# Patient Record
Sex: MALE | Race: WHITE | Employment: UNEMPLOYED | URBAN - METROPOLITAN AREA
[De-identification: names, ages, dates, MRNs, and addresses within clinical notes are randomized per-mention and may not be internally consistent; named-entity substitution may affect disease eponyms.]

---

## 2018-03-16 ENCOUNTER — OFFICE VISIT (OUTPATIENT)
Dept: SURGERY | Age: 28
End: 2018-03-16
Payer: COMMERCIAL

## 2018-03-16 VITALS
WEIGHT: 140 LBS | HEART RATE: 62 BPM | SYSTOLIC BLOOD PRESSURE: 112 MMHG | RESPIRATION RATE: 16 BRPM | HEIGHT: 69 IN | OXYGEN SATURATION: 97 % | BODY MASS INDEX: 20.73 KG/M2 | DIASTOLIC BLOOD PRESSURE: 78 MMHG

## 2018-03-16 DIAGNOSIS — K80.20 SYMPTOMATIC CHOLELITHIASIS: ICD-10-CM

## 2018-03-16 PROCEDURE — 99204 OFFICE O/P NEW MOD 45 MIN: CPT | Performed by: SURGERY

## 2018-03-16 NOTE — PATIENT INSTRUCTIONS
Cholecystectomy -- Laparoscopic Surgery   (Gallbladder Removal; Lap Monica)       Definition   Cholecystectomy is the surgical removal of the gallbladder. This procedure is most often done laparoscopically. This is done through several small incisions in the abdomen. In some cases, the doctor may switch to open surgery . This involves a large incision in the abdomen. Laparoscopic Cholecystectomy vs. Open Cholecystectomy                Reasons for Procedure   This surgery is used to remove a diseased or damaged gallbladder. The damage is often due to gallstones . Sometimes, gallstones get stuck in the gallbladder's ducts. Bile, which helps with digestion, normally flows through these ducts. The blockage can damage the gallbladder and the liver. Possible Complications   · Complications are rare, but no procedure is completely free of risk. · Gallstones that have accidentally spilled into the abdominal cavity   · Bleeding   · Infection   · Injury to other nearby structures or organs   · Reactions to general anesthesia   · Blood clots   What to Expect   · Arrange for a ride to and from the procedure. Also, have someone help you at home. · The night before, eat a light meal. Do not eat or drink anything after midnight. Anesthesia    General anesthesia will be used. You will be asleep for the procedure. Description of Procedure    Four small openings will be made in your abdomen. Carbon dioxide will be pumped in to the abdomen to provide a better view. The laparoscope will be inserted through one of the openings. It will provide images of the gallbladder and surrounding area. Instruments will be inserted through the small openings. They will be used to grasp the gallbladder and clip off the main artery and duct. The gallbladder will be removed through one of the small openings. Dye may be injected into the duct to look for stones. The entire abdomen will be carefully examined.  The incisions will be closed with sutures or staples. They will be covered with bandages. Your doctor may place a tiny, flexible tube into the area. This tube will exit from your abdomen into a little bulb. This is to drain fluid. The tube is usually removed within one week. Immediately After Procedure    You will be taken to a recovery room. How Long Will It Take? About 30-60 minutes   How Much Will It Hurt? You will have pain after the procedure. Your doctor will give you pain medicine. [address] Stay    If a laparoscopic procedure is done, you will usually go home later that day. If an open procedure is done, you will usually have a 2-3 day hospital stay. Post-procedure Care   At the Hospital    After the procedure, the hospital staff will:   · Monitor you for any problems   · Give you medicines for nausea   · Provide you with nutrition through an IV   · Help you to slowly progress from a liquid diet to soft foods   At Home    Recovery takes about three weeks. When you return home, do the following to help ensure a smooth recovery:   · Follow your doctor's instructions. · Also, follow the recommended diet and activity plan. · Ask your doctor about when it is safe to shower, bathe, or soak in water. · Your liver will take over the functions of the gallbladder. You may notice that you have more trouble digesting fatty foods, especially the first month of recovery. · You may have diarrhea until your colon adjusts to the removal of the gallbladder. · You will be unable to do any heavy lifting (over 10 pounds), pushing, pulling or straining until instructed by your physician.             Call Your Doctor   After you leave the hospital, contact your doctor if any of the following occurs:   · Signs of infection, including fever and chills   · Redness, swelling, increasing pain, excessive bleeding, or discharge at the incision site   · Cough, shortness of breath, chest pain   · Increased abdominal pain   · Pain that you cannot control with the medicines you have been given   · Blood in the stool   · Nausea and/or vomiting that you cannot control with the medicines you were given, or which last for more than two days   · Bloating and gas that persist for more than a month   · Pain, burning, urgency or frequency of urination, or blood in the urine   · Pain and/or swelling in your feet, calves, or legs   · Dark urine, light stools, or evidence of jaundice (yellowing of the skin or eyes)   In case of an emergency,  CALL 911  .      Last Reviewed: December 2010 Harley Cruz MD   Updated: 4/7/2011

## 2018-03-16 NOTE — PROGRESS NOTES
[address]    General Surgery Attending History and Physical    Patient's Name/Date of Birth: Ayse Lazaro / 1990 (53 y.o.)    Date: March 16, 2018     CC: symptomatic cholelithiasis    HPI:  31 yo complains of intermittent abdominal pain    The patient reported  acute, constant  abdominal pain localized to the right upper quadrant that started in the early morning in February. The intensity of the pain is sharp and severe. He went to the ED and was diagnosed with gallstones. The pain improved with pain meds. Worsening factors regarding the pain include fatty foods. Patient is a prisoner. Since he went to the ED in February, he has had 2 more episodes of the pain. The pain eventually went away. On further questioning, patient notes that he has had this sort of pain over the past 6 years,; however, the pain was not as severe back then. When the pain occurs now, pt is debilitated. Subjective fevers when pain occurs. No chills. No nausea. No vomiting. Patient notes the food in the shelter is heavy and fatty. History reviewed. No pertinent past medical history. History reviewed. No pertinent surgical history. Current Outpatient Prescriptions   Medication Sig Dispense Refill    pantoprazole (PROTONIX) 40 MG tablet Take 1 tablet by mouth daily 30 tablet 3    sucralfate (CARAFATE) 1 GM tablet Take 1 tablet by mouth 4 times daily 120 tablet 3     No current facility-administered medications for this visit. Allergies   Allergen Reactions    Penicillins Other (See Comments)       History reviewed. No pertinent family history. Social History     Social History    Marital status: Single     Spouse name: N/A    Number of children: N/A    Years of education: N/A     Occupational History    Not on file.      Social History Main Topics    Smoking status: Never Smoker    Smokeless tobacco: Never Used    Alcohol use No    Drug use: No    Sexual activity: Not Currently

## 2018-03-19 ENCOUNTER — TELEPHONE (OUTPATIENT)
Dept: SURGERY | Age: 28
End: 2018-03-19

## 2018-03-19 NOTE — TELEPHONE ENCOUNTER
STACEY Villasenor contacted [facility] and spoke to [facility]. STACEY Moran informed her that patient eeds to be scheduled for a lap possible open cholecystectomy possible cholangiogram with Dr Anderson Schaffer. [place] states she will forward the information on to be approved and someone will call MA back at [phone] to schedule procedure.     Electronically signed by Alejandro Enrique on 3/19/18 at 10:49 AM

## 2018-03-23 ENCOUNTER — TELEPHONE (OUTPATIENT)
Dept: SURGERY | Age: 28
End: 2018-03-23

## 2018-03-23 NOTE — TELEPHONE ENCOUNTER
Dona Peacock from [facility] called to schedule pts procedure. Procedure was authorized. MA told Kira that Moiz Vilchis will be in contact with her to schedule the pts procedure. [facility] [id number]. They can be contacted at [phone]. Routing to Moiz Vilchis for informational purposes.      Electronically signed by Loren Gibbons CMA on 3/23/18 at 9:06 AM

## 2018-04-12 RX ORDER — OXCARBAZEPINE 150 MG/1
150 TABLET, FILM COATED ORAL 2 TIMES DAILY
COMMUNITY

## 2018-04-17 ENCOUNTER — ANESTHESIA EVENT (OUTPATIENT)
Dept: OPERATING ROOM | Age: 28
End: 2018-04-17

## 2018-04-17 ENCOUNTER — TELEPHONE (OUTPATIENT)
Dept: SURGERY | Age: 28
End: 2018-04-17

## 2018-04-18 ENCOUNTER — ANESTHESIA (OUTPATIENT)
Dept: OPERATING ROOM | Age: 28
End: 2018-04-18

## 2018-04-18 ENCOUNTER — HOSPITAL ENCOUNTER (OUTPATIENT)
Age: 28
Setting detail: OUTPATIENT SURGERY
Discharge: OTHER INSTITUTION | End: 2018-04-18
Attending: SURGERY | Admitting: SURGERY
Payer: COMMERCIAL

## 2018-04-18 VITALS
DIASTOLIC BLOOD PRESSURE: 70 MMHG | WEIGHT: 138 LBS | SYSTOLIC BLOOD PRESSURE: 120 MMHG | RESPIRATION RATE: 18 BRPM | TEMPERATURE: 97.6 F | HEIGHT: 68 IN | HEART RATE: 76 BPM | BODY MASS INDEX: 20.92 KG/M2 | OXYGEN SATURATION: 100 %

## 2018-04-18 LAB
ALBUMIN SERPL-MCNC: 4.3 G/DL (ref 3.5–5.2)
ALP BLD-CCNC: 75 U/L (ref 40–129)
ALT SERPL-CCNC: 102 U/L (ref 0–40)
ANION GAP SERPL CALCULATED.3IONS-SCNC: 12 MMOL/L (ref 7–16)
AST SERPL-CCNC: 42 U/L (ref 0–39)
BILIRUB SERPL-MCNC: 0.6 MG/DL (ref 0–1.2)
BUN BLDV-MCNC: 13 MG/DL (ref 6–20)
CALCIUM SERPL-MCNC: 9.2 MG/DL (ref 8.6–10.2)
CHLORIDE BLD-SCNC: 98 MMOL/L (ref 98–107)
CO2: 30 MMOL/L (ref 22–29)
CREAT SERPL-MCNC: 0.8 MG/DL (ref 0.7–1.2)
GFR AFRICAN AMERICAN: >60
GFR NON-AFRICAN AMERICAN: >60 ML/MIN/1.73
GLUCOSE BLD-MCNC: 77 MG/DL (ref 74–109)
POTASSIUM REFLEX MAGNESIUM: 3.6 MMOL/L (ref 3.5–5)
SODIUM BLD-SCNC: 140 MMOL/L (ref 132–146)
TOTAL PROTEIN: 7.4 G/DL (ref 6.4–8.3)

## 2018-04-18 PROCEDURE — 36415 COLL VENOUS BLD VENIPUNCTURE: CPT

## 2018-04-18 PROCEDURE — 2580000003 HC RX 258: Performed by: SURGERY

## 2018-04-18 PROCEDURE — 80053 COMPREHEN METABOLIC PANEL: CPT

## 2018-04-18 RX ORDER — SODIUM CHLORIDE 9 MG/ML
INJECTION, SOLUTION INTRAVENOUS CONTINUOUS
Status: DISCONTINUED | OUTPATIENT
Start: 2018-04-18 | End: 2018-04-18 | Stop reason: HOSPADM

## 2018-04-18 RX ORDER — CLINDAMYCIN PHOSPHATE 900 MG/50ML
900 INJECTION INTRAVENOUS
Status: DISCONTINUED | OUTPATIENT
Start: 2018-04-18 | End: 2018-04-18 | Stop reason: HOSPADM

## 2018-04-18 RX ORDER — SODIUM CHLORIDE 0.9 % (FLUSH) 0.9 %
10 SYRINGE (ML) INJECTION PRN
Status: DISCONTINUED | OUTPATIENT
Start: 2018-04-18 | End: 2018-04-18 | Stop reason: HOSPADM

## 2018-04-18 RX ORDER — SODIUM CHLORIDE 0.9 % (FLUSH) 0.9 %
10 SYRINGE (ML) INJECTION EVERY 12 HOURS SCHEDULED
Status: DISCONTINUED | OUTPATIENT
Start: 2018-04-18 | End: 2018-04-18 | Stop reason: HOSPADM

## 2018-04-18 RX ADMIN — SODIUM CHLORIDE: 9 INJECTION, SOLUTION INTRAVENOUS at 11:35

## 2018-04-18 ASSESSMENT — PAIN - FUNCTIONAL ASSESSMENT: PAIN_FUNCTIONAL_ASSESSMENT: 0-10

## 2018-04-19 ENCOUNTER — TELEPHONE (OUTPATIENT)
Dept: SURGERY | Age: 28
End: 2018-04-19

## 2023-12-13 DIAGNOSIS — F11.90 OPIOID USE DISORDER: Primary | ICD-10-CM

## 2023-12-13 RX ORDER — BUPRENORPHINE AND NALOXONE 8; 2 MG/1; MG/1
1 FILM, SOLUBLE BUCCAL; SUBLINGUAL 3 TIMES DAILY
Qty: 90 FILM | Refills: 3 | Status: SHIPPED | OUTPATIENT
Start: 2023-12-13 | End: 2024-04-11

## 2024-01-23 ENCOUNTER — TELEPHONE (OUTPATIENT)
Dept: EMERGENCY MEDICINE | Facility: HOSPITAL | Age: 34
End: 2024-01-23

## 2024-01-23 RX ORDER — BUPRENORPHINE AND NALOXONE 8; 2 MG/1; MG/1
1 FILM, SOLUBLE BUCCAL; SUBLINGUAL 3 TIMES DAILY
Qty: 90 FILM | Refills: 3 | Status: CANCELLED | OUTPATIENT
Start: 2024-01-23 | End: 2024-05-22

## 2024-04-26 DIAGNOSIS — F11.90 OPIOID USE DISORDER: Primary | ICD-10-CM

## 2024-04-26 RX ORDER — BUPRENORPHINE AND NALOXONE 8; 2 MG/1; MG/1
1 FILM, SOLUBLE BUCCAL; SUBLINGUAL 3 TIMES DAILY
Qty: 90 FILM | Refills: 3 | Status: SHIPPED | OUTPATIENT
Start: 2024-04-26 | End: 2024-08-24

## 2024-10-13 DIAGNOSIS — F11.90 OPIOID USE DISORDER: Primary | ICD-10-CM

## 2024-10-13 RX ORDER — BUPRENORPHINE AND NALOXONE 8; 2 MG/1; MG/1
1 FILM, SOLUBLE BUCCAL; SUBLINGUAL 3 TIMES DAILY
Qty: 90 FILM | Refills: 3 | Status: SHIPPED | OUTPATIENT
Start: 2024-10-13 | End: 2025-02-10

## 2024-11-13 DIAGNOSIS — F11.90 OPIOID USE DISORDER: ICD-10-CM

## 2024-11-13 RX ORDER — BUPRENORPHINE AND NALOXONE 8; 2 MG/1; MG/1
1 FILM, SOLUBLE BUCCAL; SUBLINGUAL 3 TIMES DAILY
Qty: 90 FILM | Refills: 3 | Status: SHIPPED | OUTPATIENT
Start: 2024-11-13 | End: 2024-11-23 | Stop reason: SDUPTHER

## 2024-11-23 DIAGNOSIS — F11.90 OPIOID USE DISORDER: ICD-10-CM

## 2024-11-23 RX ORDER — BUPRENORPHINE AND NALOXONE 8; 2 MG/1; MG/1
1 FILM, SOLUBLE BUCCAL; SUBLINGUAL 3 TIMES DAILY
Qty: 90 FILM | Refills: 3 | Status: SHIPPED | OUTPATIENT
Start: 2024-11-23 | End: 2025-03-23

## 2025-01-10 DIAGNOSIS — F11.90 OPIOID USE DISORDER: ICD-10-CM

## 2025-01-10 RX ORDER — BUPRENORPHINE AND NALOXONE 8; 2 MG/1; MG/1
1 FILM, SOLUBLE BUCCAL; SUBLINGUAL 3 TIMES DAILY
Qty: 90 FILM | Refills: 3 | Status: SHIPPED | OUTPATIENT
Start: 2025-01-10 | End: 2025-05-10

## 2025-01-22 ENCOUNTER — HOSPITAL ENCOUNTER (OUTPATIENT)
Dept: RADIOLOGY | Facility: HOSPITAL | Age: 35
Discharge: HOME | End: 2025-01-22
Payer: MEDICAID

## 2025-01-22 DIAGNOSIS — S02.91XA: ICD-10-CM

## 2025-01-22 PROCEDURE — 70450 CT HEAD/BRAIN W/O DYE: CPT

## (undated) DEVICE — CAMERA STRYKER 1488 HD GEN

## (undated) DEVICE — TROCAR: Brand: KII FIOS FIRST ENTRY

## (undated) DEVICE — 1.5L THIN WALL CAN: Brand: CRD

## (undated) DEVICE — CONTROL SYRINGE LUER-LOCK TIP: Brand: MONOJECT

## (undated) DEVICE — PMI DISPOSABLE PUNCTURE CLOSURE DEVICE / SUTURE GRASPER: Brand: PMI

## (undated) DEVICE — GLOVE SURG SZ 75 STD WHT LTX SYN POLYMER BEAD REINF ANTI RL

## (undated) DEVICE — SET ENDO INSTR LAPAROSCOPIC STGENLAP

## (undated) DEVICE — APPLIER CLP M/L SHFT DIA5MM 15 LIG LIGAMAX 5

## (undated) DEVICE — LAPAROSCOPIC SCISSORS: Brand: EPIX LAPAROSCOPIC SCISSORS

## (undated) DEVICE — TROCAR: Brand: KII® SLEEVE

## (undated) DEVICE — TIBURON GENERAL ENDOSCOPY DRAPE: Brand: CONVERTORS

## (undated) DEVICE — SURGICAL PROCEDURE PACK BASIC

## (undated) DEVICE — SET INSTRUMENT LAP I

## (undated) DEVICE — INTENDED FOR TISSUE SEPARATION, AND OTHER PROCEDURES THAT REQUIRE A SHARP SURGICAL BLADE TO PUNCTURE OR CUT.: Brand: BARD-PARKER ® STAINLESS STEEL BLADES

## (undated) DEVICE — SOLUTION IV IRRIG WATER 1000ML POUR BRL 2F7114

## (undated) DEVICE — STANDARD HYPODERMIC NEEDLE,ALUMINUM HUB: Brand: MONOJECT

## (undated) DEVICE — CHLORAPREP 26ML ORANGE

## (undated) DEVICE — SOLUTION IV IRRIG POUR BRL 0.9% SODIUM CHL 2F7124

## (undated) DEVICE — SKIN AFFIX SURG ADHESIVE 72/CS 0.55ML: Brand: MEDLINE

## (undated) DEVICE — INSUFFLATION TUBING SET WITH FILTER, FUNNEL CONNECTOR AND LUER LOCK: Brand: JOSNOE MEDICAL INC

## (undated) DEVICE — ELECTRODE PT RET AD L9FT HI MOIST COND ADH HYDRGEL CORDED

## (undated) DEVICE — ANTI-FOG SOLUTION WITH FOAM PAD: Brand: DEVON

## (undated) DEVICE — SYRINGE MED 20ML STD CLR PLAS LUERLOCK TIP N CTRL DISP

## (undated) DEVICE — TOWEL,OR,DSP,ST,BLUE,STD,6/PK,12PK/CS: Brand: MEDLINE